# Patient Record
Sex: MALE | Race: WHITE | URBAN - METROPOLITAN AREA
[De-identification: names, ages, dates, MRNs, and addresses within clinical notes are randomized per-mention and may not be internally consistent; named-entity substitution may affect disease eponyms.]

---

## 2024-08-14 ENCOUNTER — HOME HEALTH ADMISSION (OUTPATIENT)
Age: 69
End: 2024-08-14
Payer: MEDICARE

## 2024-08-16 ENCOUNTER — HOME CARE VISIT (OUTPATIENT)
Age: 69
End: 2024-08-16

## 2024-08-16 VITALS
SYSTOLIC BLOOD PRESSURE: 146 MMHG | HEART RATE: 96 BPM | DIASTOLIC BLOOD PRESSURE: 72 MMHG | TEMPERATURE: 97.6 F | RESPIRATION RATE: 20 BRPM | OXYGEN SATURATION: 100 %

## 2024-08-16 PROCEDURE — 0221000100 HH NO PAY CLAIM PROCEDURE

## 2024-08-16 PROCEDURE — G0299 HHS/HOSPICE OF RN EA 15 MIN: HCPCS

## 2024-08-16 ASSESSMENT — ENCOUNTER SYMPTOMS
BOWEL INCONTINENCE: 1
PAIN LOCATION - PAIN QUALITY: SHARP

## 2024-08-16 NOTE — HOME HEALTH
are met and understands all medication purpose/frequencies/side effects. Pt/Caregiver did verbalize understanding of discharge planning.     Next MD appointment TBD (date) with  MD/NP/PA.  Patient/caregiver encouraged/instructed to keep appointment as lack of follow through with physician appointment could result in discontinuation of home care services for non-compliance. Will call pcp today.

## 2024-08-19 ENCOUNTER — HOME CARE VISIT (OUTPATIENT)
Age: 69
End: 2024-08-19

## 2024-08-19 VITALS
OXYGEN SATURATION: 98 % | TEMPERATURE: 97 F | SYSTOLIC BLOOD PRESSURE: 144 MMHG | RESPIRATION RATE: 18 BRPM | DIASTOLIC BLOOD PRESSURE: 70 MMHG | HEART RATE: 87 BPM

## 2024-08-19 PROCEDURE — G0300 HHS/HOSPICE OF LPN EA 15 MIN: HCPCS

## 2024-08-19 ASSESSMENT — ENCOUNTER SYMPTOMS: DIARRHEA: 1

## 2024-08-19 NOTE — HOME HEALTH
Skilled Needs: Education and Wound Care   Caregiver involvement: Pt dependent with care getting in and out of the bed    Medications reviewed and all medications are available in the home this visit.    The following education was provided regarding medications:  HERMAN SIMMONS notified of any discrepancies/look a-like medications/medication interactions: HERMAN  Medications are effecrtive at this time.    Home health supplies by type and quantity ordered/delivered this visit include:  Supplies available   Patient education provided this visit:  Reviewed to reported any redness, swelling, warmth, fever, chills, increased heart/respiratory rate, uncontrolled pain/tenderness, drainage:green/yellow/brown, or odor to MD immediately. Nurse arrived pt in bed no distress noted. Wound Vac in place. Wound Vac removed wound cleaned and vac reapplied as ordered. Nurse assised pt and son with getting pt in the sadie lift into the chair no concerns at this time.  Pt did not check glucose leve due to needing batteries for glucometer. Set schedule for visits with patient for the rest of this week.   Sharps education provided: HERMAN  Patient level of understanding of education provided: Verbalzied all understanding to above education  Skilled Care Performed this visit: Education and Wound Care  Patient response to procedure performed: Minimal pain during dressing change   Agency Progress toward goals: Progressing toward interventions above  Patient's Progress towards personal goals: when patient reaches goals and medication is managed, and disease processes are understood patient agrees and understand that discharge will take place

## 2024-08-20 ENCOUNTER — HOME CARE VISIT (OUTPATIENT)
Age: 69
End: 2024-08-20

## 2024-08-20 VITALS
DIASTOLIC BLOOD PRESSURE: 76 MMHG | HEART RATE: 90 BPM | RESPIRATION RATE: 16 BRPM | TEMPERATURE: 98.2 F | SYSTOLIC BLOOD PRESSURE: 140 MMHG | OXYGEN SATURATION: 98 %

## 2024-08-20 PROCEDURE — G0151 HHCP-SERV OF PT,EA 15 MIN: HCPCS

## 2024-08-20 ASSESSMENT — ENCOUNTER SYMPTOMS: PAIN LOCATION - PAIN QUALITY: ACHE

## 2024-08-21 ENCOUNTER — HOME CARE VISIT (OUTPATIENT)
Age: 69
End: 2024-08-21

## 2024-08-21 VITALS
SYSTOLIC BLOOD PRESSURE: 140 MMHG | HEART RATE: 76 BPM | OXYGEN SATURATION: 97 % | RESPIRATION RATE: 19 BRPM | TEMPERATURE: 97 F | DIASTOLIC BLOOD PRESSURE: 70 MMHG

## 2024-08-21 VITALS
TEMPERATURE: 97.7 F | HEART RATE: 90 BPM | OXYGEN SATURATION: 97 % | SYSTOLIC BLOOD PRESSURE: 130 MMHG | DIASTOLIC BLOOD PRESSURE: 82 MMHG

## 2024-08-21 PROCEDURE — G0300 HHS/HOSPICE OF LPN EA 15 MIN: HCPCS

## 2024-08-21 PROCEDURE — G0157 HHC PT ASSISTANT EA 15: HCPCS

## 2024-08-21 ASSESSMENT — ENCOUNTER SYMPTOMS
PAIN LOCATION - PAIN QUALITY: BURNING
BOWEL INCONTINENCE: 1

## 2024-08-21 NOTE — HOME HEALTH
Skilled Needs: Education and Wound Care   Caregiver involvement: Pt dependent with care with the help of family and with the exception of wound treatment due to location   Medications reviewed and all medications are available in the home this visit.    The following education was provided regarding medications:  HERMAN SIMMONS notified of any discrepancies/look a-like medications/medication interactions: HERMAN  Medications are effecrtive at this time.    Home health supplies by type and quantity ordered/delivered this visit include:  Supplies available   Patient education provided this visit:  Reviewed to reported any redness, swelling, warmth, fever, chills, increased heart/respiratory rate, uncontrolled pain/tenderness, drainage:green/yellow/brown, or odor to MD immediately. Wound Vac dressing changed with no pain voiced. Pt had a BM nurse cleaned and changed pt and helped son reposition pt in the bed. Nurse educated he needs to get strips and batteries for his glucomoeter to keep control of bloodsugars exspecially with being on insulin. Son voiced was going to get them today. No change in medication at this time   Sharps education provided: HERMAN  Patient level of understanding of education provided: Verbalzied all understanding to above education  Skilled Care Performed this visit: Education and Wound Care  Patient response to procedure performed: Minimal pain during dressing change   Agency Progress toward goals: Progressing toward interventions above  Patient's Progress towards personal goals: when patient reaches goals and medication is managed, and disease processes are understood patient agrees and understand that discharge will take place
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
## 2024-08-21 NOTE — HOME HEALTH
SUBJECTIVE: The pt at home, alone, at the time of today's visit. The pt states that he ordered a sliding board and it should be delivered tomorrow.   .  PAIN: see pain assessment  OBJECTIVE: see interventions  .  NEXT MD APPT: MARTINA  .  CAREGIVER ASSISTANCE NEEDED FOR: The pt lives with his wife and son. His son is his primary CG. He requires assistance for ADLs, transportation, shopping, meals, medication management, safety.  .  ASSESSMENT AND PROGRESS TOWARD GOALS: The pt presents with generalized weakness with increased R knee pain, worsening with mvmt. He is D for transfers at this time and requires additonal time and assistance for all mobility. He will benefit from continued HHPT to increase strength and improve the ability to transfer - reducing the overall burden of care and increased risks of rehospitalizations.   PATIENT RESPONSE TO TREATMENT: reported fatigue after today's visit.   PATIENT LEVEL OF UNDERSTANDING OF EDUCATION: The pt able to teach back the HEP.   .  CONTINUED NEED FOR THE FOLLOWING SKILLS: HH PT is medically necessary to address pain, decreased ROM, decreased strength, increased swelling, impaired bed mobility, decreased independence with functional transfers, impaired gait, impaired stair negotiation, and impaired balance in order to improve functional independence, quality of life, return to PLOF, reduce the risk for falls, and reduce pain.  .  PLAN: Plan to address transfers with the sliding board, next visit.   DISCHARGE PLANNING DISCUSSED: Discharge to self and family under MD supervision once all goals have been met or patient has reached maximum potential. Discussed with the pt the POC to continue HHPT with the remaining frequency of 1 more visit this week then 3w3, 1w1. The pt is in agreement to the POC at this time.

## 2024-08-22 ASSESSMENT — ENCOUNTER SYMPTOMS: PAIN LOCATION - PAIN QUALITY: PAIN

## 2024-08-23 ENCOUNTER — HOME CARE VISIT (OUTPATIENT)
Age: 69
End: 2024-08-23
Payer: MEDICARE

## 2024-08-23 ENCOUNTER — HOME CARE VISIT (OUTPATIENT)
Age: 69
End: 2024-08-23

## 2024-08-23 VITALS
TEMPERATURE: 97.8 F | OXYGEN SATURATION: 99 % | HEART RATE: 91 BPM | SYSTOLIC BLOOD PRESSURE: 104 MMHG | DIASTOLIC BLOOD PRESSURE: 60 MMHG

## 2024-08-23 VITALS
HEART RATE: 91 BPM | TEMPERATURE: 97.8 F | DIASTOLIC BLOOD PRESSURE: 60 MMHG | SYSTOLIC BLOOD PRESSURE: 104 MMHG | OXYGEN SATURATION: 99 % | RESPIRATION RATE: 18 BRPM

## 2024-08-23 PROCEDURE — G0157 HHC PT ASSISTANT EA 15: HCPCS

## 2024-08-23 PROCEDURE — G0300 HHS/HOSPICE OF LPN EA 15 MIN: HCPCS

## 2024-08-23 PROCEDURE — G0152 HHCP-SERV OF OT,EA 15 MIN: HCPCS

## 2024-08-23 ASSESSMENT — ENCOUNTER SYMPTOMS: PAIN LOCATION - PAIN QUALITY: ARTHRITIC

## 2024-08-23 NOTE — HOME HEALTH
Caregiver involvement: Raymond (son) lives with son and assisting with all ADL    Medications reviewed and all medications are available in the home this visit.    The following education was provided regarding medications, medication interactions, and look alike medications (specify): Continue as directed by MD.    Medications  are effective at this time.      Patient education provided this visit: see ADL note    Sharps education provided:  Clinician instructed patient/CG on proper disposal of sharps: Containers should be made of hard plastic, be puncture-resistant and leakproof, such as a laundry detergent or bleach bottle.  When the container is ¾ full, it should be sealed with tape and labeled DO NOT RECYCLE prior to discarding in the regular trash.    Pt does not check BG daily    Patient level of understanding of education provided: see ADL note    Skilled Care Performed this visit: Completed OT evaluation and assessment for safety with ADL and mobility.    Modified Barthel ADL Index  Bowels              (x ) 0 = Incontinent or needs enemas              ( ) 1 = Occasional Accident              ( ) 2 = Continent  Bladder               (x ) 0 = Incontinent              ( ) 1 = Occasional accident (1x/wk)              ( ) 2 = Continent  Grooming               (x ) 0 = Needs help with personal care              ( ) 1 = Independent (including face, hair, teeth, shaving)  Toilet Use               (x ) 0 = Dependent              ( ) 1 = Needs some help              ( ) 2 = Independent  Feeding              ( ) 0 = Unable              ( ) 1 = Needs help, e.g. cutting              ( x) 2 = Independent  Transfers   (bed to chair and back)              ( ) 0 = Unable, no sitting balance              (x ) 1 = Major help (1 or 2 people), can sit              ( ) 2 = Minor help (verbal or physical)              ( ) 3 = Independent  Mobility              (x ) 0 = Immobile              ( ) 1 = Wheelchair independent (including

## 2024-08-25 VITALS
OXYGEN SATURATION: 98 % | RESPIRATION RATE: 16 BRPM | SYSTOLIC BLOOD PRESSURE: 132 MMHG | TEMPERATURE: 97 F | DIASTOLIC BLOOD PRESSURE: 78 MMHG | HEART RATE: 76 BPM

## 2024-08-26 ENCOUNTER — HOME CARE VISIT (OUTPATIENT)
Age: 69
End: 2024-08-26
Payer: MEDICARE

## 2024-08-26 VITALS
SYSTOLIC BLOOD PRESSURE: 132 MMHG | TEMPERATURE: 97.7 F | HEART RATE: 89 BPM | RESPIRATION RATE: 18 BRPM | OXYGEN SATURATION: 98 % | DIASTOLIC BLOOD PRESSURE: 74 MMHG

## 2024-08-26 VITALS
HEART RATE: 98 BPM | SYSTOLIC BLOOD PRESSURE: 138 MMHG | OXYGEN SATURATION: 98 % | TEMPERATURE: 98 F | DIASTOLIC BLOOD PRESSURE: 76 MMHG

## 2024-08-26 VITALS
RESPIRATION RATE: 17 BRPM | TEMPERATURE: 98.3 F | OXYGEN SATURATION: 97 % | DIASTOLIC BLOOD PRESSURE: 82 MMHG | HEART RATE: 88 BPM | SYSTOLIC BLOOD PRESSURE: 120 MMHG

## 2024-08-26 VITALS
HEART RATE: 89 BPM | DIASTOLIC BLOOD PRESSURE: 74 MMHG | OXYGEN SATURATION: 98 % | TEMPERATURE: 97.7 F | SYSTOLIC BLOOD PRESSURE: 132 MMHG | RESPIRATION RATE: 18 BRPM

## 2024-08-26 PROCEDURE — G0153 HHCP-SVS OF S/L PATH,EA 15MN: HCPCS

## 2024-08-26 PROCEDURE — G0300 HHS/HOSPICE OF LPN EA 15 MIN: HCPCS

## 2024-08-26 PROCEDURE — G0158 HHC OT ASSISTANT EA 15: HCPCS

## 2024-08-26 PROCEDURE — G0157 HHC PT ASSISTANT EA 15: HCPCS

## 2024-08-26 ASSESSMENT — ENCOUNTER SYMPTOMS
PAIN LOCATION - PAIN QUALITY: SHARP, STABBING
PAIN LOCATION - PAIN QUALITY: ACHING
PAIN LOCATION - PAIN QUALITY: TENDER

## 2024-08-26 NOTE — HOME HEALTH
SUBJECTIVE: The pt states that he spent 6-7 hours out of the bed yesterday.   .  PAIN: see pain assessment    OBJECTIVE: see interventions  .  NEXT MD APPT: Wednesday 8/28/24 - [facility] f/u appt, 9/6/24 with PCP  .  CAREGIVER ASSISTANCE NEEDED FOR: The pt lives with his wife and son. His son is his primary CG. He requires assistance for ADLs, transportation, shopping, meals, medication management, safety.  .  ASSESSMENT AND PROGRESS TOWARD GOALS/PATIENT RESPONSE TO TREATMENT: The pt continues to be limited with activity due to R knee pain. He is improving as noted with the ability to perform sliding board transfers improving from sadie lift transfers, but he does continue to require mod/max(A) for the transfers and for board placement. He will continue to benefit from HHPT to progress the safe ability to perform functional mobility, reducing the risks of falls/hospitalizations and the overall burden of care, progressing towards PLF.     PATIENT LEVEL OF UNDERSTANDING OF EDUCATION: The pt able to teach back the need for increased frequency of positional changes for reducing continued skin breakdown.   .  CONTINUED NEED FOR THE FOLLOWING SKILLS: HH PT is medically necessary to address pain, decreased ROM, decreased strength, increased swelling, impaired bed mobility, decreased independence with functional transfers, impaired gait, and impaired balance in order to improve functional independence, quality of life, return to PLOF, reduce the risk for falls, and reduce pain.  .  PLAN:  Plan to continue to progress the strengthening program and work on improving the safe (I) ability to perform transfers.   .  DISCHARGE PLANNING DISCUSSED: Discharge to self and family under MD supervision once all goals have been met or patient has reached maximum potential. Discussed with the pt the POC to continue HHPT with the remaining frequency of 2 more visits this week then 3w2, 1w1. The pt is in agreement to the POC at this time.

## 2024-08-26 NOTE — CASE COMMUNICATION
Patient seen for speech therapy evaluation today. Educated patient on SLP scope of practice, patient denied any speech, cognition, or swallowing concerns. Patient completed SLUMS Assessment with score of 29/30, which suggests cognition is WFL. Patient was able to recall and provide recent medical history with no difficulty. No further speech therapy services warranted at this time.

## 2024-08-26 NOTE — HOME HEALTH
Skilled Needs: Education and Wound Care   Caregiver involvement: Pt independent with care with the exception of wound treatment due to location   Medications reviewed and all medications are available in the home this visit.    The following education was provided regarding medications:  HERMAN SIMMONS notified of any discrepancies/look a-like medications/medication interactions: HERMAN  Medications are effecrtive at this time.    Home health supplies by type and quantity ordered/delivered this visit include:  Supplies available   Patient education provided this visit:  Reviewed to reported any redness, swelling, warmth, fever, chills, increased heart/respiratory rate, uncontrolled pain/tenderness, drainage:green/yellow/brown, or odor to MD immediately. Wound vac dressing changed with no pain verbalzied this visit Pt is due to see his PCP on the 8th of Septmeber, Pt is due to see Surgeon on wednesday and will not need a nursing visit until friday. Jack advised pt will be out of town and will be back to do wound the following week but someone will be filling in fo rme while I am gone,   Fiona education provided: HERMAN  Patient level of understanding of education provided: Verbalzied all understanding to above education  Skilled Care Performed this visit: Education and Wound Care  Patient response to procedure performed: Minimal pain during dressing change   Agency Progress toward goals: Progressing toward interventions above  Patient's Progress towards personal goals: when patient reaches goals and medication is managed, and disease processes are understood patient agrees and understand that discharge will take place

## 2024-08-26 NOTE — CASE COMMUNICATION
Occupational Therapy Evaluation    1w1, 2w4    Mr. Obregon is bed bound and unable to ambulate.  Family assists him with transfers using a sadie lift from the hospital bed to the w/c.  Mr. Obregon is able to perform supine <> sit with min A to manage LEs.  He is CGA to roll to the R side with bed rail but max/dependent to roll to the L.  Mr. Obregon is dependent on family for all self care to include grooming, sponge bathing, dressing and william leting.  Today, pt was provided a wash cloth and he was able to participate with washing face and upper body.  He was able to apply deodorant.  Instruction provided to family to encourage pt participation with self care when able to improve pt participation with daily routines.  Currently, pt is using a brief for bowel and bladder management.  He reports that he is able to control bowel and bladder but uses brief for ease.  He has a sta ndard 3:1.  Recommended use of a drop arm commode for ease with transfers using a SB.  Pt performed SB transfer training with PT today.  Son agreeable to assist pt with purchase of a drop arm commode.  Mr. Obregon is agreeable for ongoing OT to maximize his safety and participation with daily routines.

## 2024-08-26 NOTE — HOME HEALTH
SUBJECTIVE: Pt reclined in bed upon arrival, reported he was feeling fair and was having the same pain in his R leg and L shoulder that he has been having.  CAREGIVER ASSISTANCE NEEDED FOR: Pts son present during visit for education and assistance  MEDICATIONS REVIEWED AND UPDATED: no medication changes reported  .  OBJECTIVE: see interventions  PATIENT RESPONSE TO TREATMENT: Pt demonstrated positive response to treatment with good participation and training.  .  PATIENT/CAREGIVER EDUCATION PROVIDED THIS VISIT:  Therapist educated pt and son on bed level exercises, bed mobility techniques and use of bed ladder to help with UE and core strengthening.  PATIENT LEVEL OF UNDERSTANDING OF EDUCATION PROVIDED: Pt and CG demonstrated good teachback with exercises and bed mobility, verbalized they will order bed ladder for working with.  ASSESSMENT AND PROGRESS TOWARD GOALS: Pt demonstrated good progress with education to achieve goals set forth.  PLAN: next visit will be for bed mobility and ADL training.   DISCHARGE PLANNING DISCUSSED: Discharge to self and family under MD supervision once all goals have been met or patient has reached maximum potential. Frequency remaininw1, 2w3 remaining.
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
## 2024-08-27 ENCOUNTER — HOME CARE VISIT (OUTPATIENT)
Age: 69
End: 2024-08-27
Payer: MEDICARE

## 2024-08-27 VITALS
HEART RATE: 99 BPM | OXYGEN SATURATION: 98 % | SYSTOLIC BLOOD PRESSURE: 124 MMHG | DIASTOLIC BLOOD PRESSURE: 80 MMHG | TEMPERATURE: 98 F

## 2024-08-27 PROCEDURE — G0158 HHC OT ASSISTANT EA 15: HCPCS

## 2024-08-27 PROCEDURE — G0157 HHC PT ASSISTANT EA 15: HCPCS

## 2024-08-27 ASSESSMENT — ENCOUNTER SYMPTOMS: PAIN LOCATION - PAIN QUALITY: STABBING PAIN

## 2024-08-27 NOTE — HOME HEALTH
SUBJECTIVE: The pt states that his next goal is to get on the BSC to have a bowel mvmt. The pt wearing a knee brace on this date that he feels is giving him some relief in the pain.   .  PAIN: see pain assessment  OBJECTIVE: see interventions  .  NEXT MD APPT: Wednesday 8/28/24 - [facility] f/u appt, 9/6/24 with PCP  .  CAREGIVER ASSISTANCE NEEDED FOR: The pt lives with his wife and son. His son is his primary CG. He requires assistance for ADLs, transportation, shopping, meals, medication management, safety.  .  ASSESSMENT AND PROGRESS TOWARD GOALS/PATIENT RESPONSE TO TREATMENT: The pt demonstrated a positive result in HHPT on this date as noted with the ability to perform bed<>BSC transfers utilizing stand/pivot with therapist, max(A). He also demonstrated improved sit to stand from the bed to the rollator walker with mod(ANGEL) with the ability to stand x 10 secs. Right knee pain continues to limit his overall mobility. He will benefit from continued HHPT to progress the ability to perform functional mobility, decreasing his fall risks and the overall burden of care.   .  PATIENT LEVEL OF UNDERSTANDING OF EDUCATION: The pt able to teach back the need for increased frequency of positional changes for reducing continued skin breakdown. .    CONTINUED NEED FOR THE FOLLOWING SKILLS: HH PT is medically necessary to address pain, decreased ROM, decreased strength, increased swelling, impaired bed mobility, decreased independence with functional transfers, impaired gait, and impaired balance in order to improve functional independence, quality of life, return to PLOF, reduce the risk for falls, and reduce pain.  .  PLAN: Plan to progress the HEP and readdress transfers, next visit.   .  DISCHARGE PLANNING DISCUSSED: Discharge to self and family under MD supervision once all goals have been met or patient has reached maximum potential. Discussed with the pt the POC to continue HHPT with the remaining frequency of 1 more visit this

## 2024-08-28 ENCOUNTER — HOME CARE VISIT (OUTPATIENT)
Age: 69
End: 2024-08-28
Payer: MEDICARE

## 2024-08-28 VITALS
OXYGEN SATURATION: 100 % | HEART RATE: 82 BPM | TEMPERATURE: 97.7 F | SYSTOLIC BLOOD PRESSURE: 118 MMHG | RESPIRATION RATE: 18 BRPM | DIASTOLIC BLOOD PRESSURE: 84 MMHG

## 2024-08-28 PROCEDURE — G0155 HHCP-SVS OF CSW,EA 15 MIN: HCPCS

## 2024-08-28 ASSESSMENT — ENCOUNTER SYMPTOMS: PAIN LOCATION - PAIN QUALITY: ACHING, SORE

## 2024-08-28 NOTE — HOME HEALTH
SUBJECTIVE: Pt sitting EOB upon arrival, reported he had gotten there on his own with sons S, pt agreeable to work on bed mobility, ADLs and transfers.  CAREGIVER ASSISTANCE NEEDED FOR: pts son present during visit for education  MEDICATIONS REVIEWED AND UPDATED: no medication changes reported  .  OBJECTIVE: see interventions  PATIENT RESPONSE TO TREATMENT: Pt demonstrated positive response to treatment with min increased pain and good participation.  .  PATIENT/CAREGIVER EDUCATION PROVIDED THIS VISIT:  Therapist educated pt on bed mobility, SB transfers and ADL technique for dressing.  PATIENT LEVEL OF UNDERSTANDING OF EDUCATION PROVIDED: Pt demonstrated good teachback and verbalized understanding for daily carry over.  ASSESSMENT AND PROGRESS TOWARD GOALS: Pt demonstrated good progress with increased I to maneuver in bed, perform dressing and SB transfers with decreased assistance.  PLAN: next visit will be for continued transfer and ADL training.   DISCHARGE PLANNING DISCUSSED: Discharge to self and family under MD supervision once all goals have been met or patient has reached maximum potential. Frequency remaininw3 remaining.

## 2024-08-29 ENCOUNTER — HOME CARE VISIT (OUTPATIENT)
Age: 69
End: 2024-08-29
Payer: MEDICARE

## 2024-08-29 VITALS
OXYGEN SATURATION: 100 % | HEART RATE: 99 BPM | TEMPERATURE: 97.9 F | SYSTOLIC BLOOD PRESSURE: 118 MMHG | DIASTOLIC BLOOD PRESSURE: 68 MMHG

## 2024-08-29 VITALS
HEART RATE: 78 BPM | OXYGEN SATURATION: 97 % | RESPIRATION RATE: 18 BRPM | DIASTOLIC BLOOD PRESSURE: 68 MMHG | TEMPERATURE: 97 F | SYSTOLIC BLOOD PRESSURE: 116 MMHG

## 2024-08-29 PROCEDURE — G0300 HHS/HOSPICE OF LPN EA 15 MIN: HCPCS

## 2024-08-29 PROCEDURE — G0157 HHC PT ASSISTANT EA 15: HCPCS

## 2024-08-29 NOTE — HOME HEALTH
SUBJECTIVE: The pt seated in the WC upon today's arrival - new WC arrived today with the appropriate fit. The pt wearing a knee brace with a hole at the patella with noted edema, the brace rolled down due to apparent indentations from the increased edema. PTA educated the pt on need for resting time from the brace, elevation and ice for edema control.  .  PAIN: see pain assessment    OBJECTIVE: see interventions  .  NEXT MD APPT: Wednesday 8/28/24 - [facility] f/u appt, 9/6/24 with PCP  .  CAREGIVER ASSISTANCE NEEDED FOR: The pt lives with his wife and son. His son is his primary CG. He requires assistance for ADLs, transportation, shopping, meals, medication management, safety.  .  ASSESSMENT AND PROGRESS TOWARD GOALS/PATIENT RESPONSE TO TREATMENT: The pts progress is limited by R knee pain at this time. The pt unable to stand safely without R knee pain; therefore, making gait not safe/tolerable at this time. The pt unable to participate in ther exs on this date due to increased R knee pain with mvmt. Due to R knee pain he is at risks for falls and unable to progress towards ambulation and stand/pivot transfers with the walker and unable to tolerate R knee ther exs on this date. He will benefit from a medical appt to address R knee pain in which he was instructed to do at the time of today's appt.  .  PATIENT LEVEL OF UNDERSTANDING OF EDUCATION: The pt able to teach back the need for increased frequency of positional changes for reducing continued skin breakdown. .    CONTINUED NEED FOR THE FOLLOWING SKILLS: HH PT is medically necessary to address pain, decreased ROM, decreased strength, increased swelling, impaired bed mobility, decreased independence with functional transfers, impaired gait, and impaired balance in order to improve functional independence, quality of life, return to PLOF, reduce the risk for falls, and reduce pain.  .  PLAN: stretching and supine ther ex progression next visit, will readdress transfers

## 2024-08-29 NOTE — HOME HEALTH
Skilled Needs: Education and Wound Care   Caregiver involvement: Pt independent with care with the exception of wound treatment due to location   Medications reviewed and all medications are available in the home this visit.    The following education was provided regarding medications:  HERMAN SIMMONS notified of any discrepancies/look a-like medications/medication interactions: HERMAN  Medications are effecrtive at this time.    Home health supplies by type and quantity ordered/delivered this visit include:  Supplies available   Patient education provided this visit:  Reviewed to reported any redness, swelling, warmth, fever, chills, increased heart/respiratory rate, uncontrolled pain/tenderness, drainage:green/yellow/brown, or odor to MD immediately. Wound care completed vac removed and dressing changed with minimal pain vrbalzied at time of dressing change. Pts drop down commode arrived during nursing visit. Pt looking forward to getting stronger and being able to wak again. No other changes since last visit.  Pt is not checking his glucose level as he is suppose to nurse educated ths importance of keeping rack of glucose to help with wound healing Pt verbalzied he understood.   Sharps education provided: HERMAN  Patient level of understanding of education provided: Verbalzied all understanding to above education  Skilled Care Performed this visit: Education and Wound Care  Patient response to procedure performed: Minimal pain during dressing change   Agency Progress toward goals: Progressing toward interventions above  Patient's Progress towards personal goals: when patient reaches goals and medication is managed, and disease processes are understood patient agrees and understand that discharge will take place

## 2024-08-30 ENCOUNTER — HOME CARE VISIT (OUTPATIENT)
Age: 69
End: 2024-08-30
Payer: MEDICARE

## 2024-09-02 ENCOUNTER — HOME CARE VISIT (OUTPATIENT)
Age: 69
End: 2024-09-02
Payer: MEDICARE

## 2024-09-03 ENCOUNTER — HOME CARE VISIT (OUTPATIENT)
Age: 69
End: 2024-09-03
Payer: MEDICARE

## 2024-09-03 VITALS
OXYGEN SATURATION: 100 % | RESPIRATION RATE: 16 BRPM | HEART RATE: 98 BPM | SYSTOLIC BLOOD PRESSURE: 148 MMHG | DIASTOLIC BLOOD PRESSURE: 82 MMHG | TEMPERATURE: 97.7 F

## 2024-09-03 VITALS
TEMPERATURE: 98.5 F | DIASTOLIC BLOOD PRESSURE: 72 MMHG | HEART RATE: 91 BPM | OXYGEN SATURATION: 97 % | SYSTOLIC BLOOD PRESSURE: 136 MMHG

## 2024-09-03 ASSESSMENT — ENCOUNTER SYMPTOMS
PAIN LOCATION - PAIN QUALITY: SORE, ACHE, TENDER
PAIN LOCATION - PAIN QUALITY: SHARP
BOWEL INCONTINENCE: 1

## 2024-09-03 NOTE — HOME HEALTH
Skilled reason for visit: wound care  called [facility] surgical group as left hip wound: foul odor, large purulent drainage, erythema around the wound and warmth noted. periarea irritation (open areas wound the distal part of wound) reported smell started a couple of days ago. reported having chills the other night. per Kevin Ortiz requested wound vac be left off and silver alginate Ag packing/DSD ok. will prescribed antibiotics and scheduled appt for tomorrow for wound eval.  verbalized understanding and repeat back    Caregiver involvement: son and wife assist with ADLS/meal prep/med management and medical transport to appts.    Medications reviewed and all medications are available in the home this visit.    The following education was provided regarding medications:  reviewed all medication bottles in home for frequency, side effects and precautions. verbalized understanding and repeat back        Medications are effective at this time.      Home health supplies by type and quantity ordered/delivered this visit include: wound care supplies in the home  Patient education provided this visit: see intervention tab.          Patient level of understanding of education provided: see intervention tab.      Patient response to procedure performed:  tolerated wound care with complaints of burning but resolved after treatment    Agency Progress toward goals: see intervention tab for progressing toward goals      Patient's Progress towards personal goals: see intervention tab for progressing toward goals        Home exercise program: cont to take all medications/diet as prescribed, follow pressure ulcer precautions, follow all fall precautions and use any assistive devices recommended by therapy or medical providers, reported any abnormal s/sx of infection to MD or ER immediately. disease process teaching packets/ home care booklet for reference. call home care for any concerns/questions. keep all medical

## 2024-09-03 NOTE — HOME HEALTH
declined to return to the bed on this date.   .    DISCHARGE PLANNING DISCUSSED: Discharge to self and family under MD supervision once all goals have been met or patient has reached maximum potential. Discussed with the pt the POC to continue HHPT with the remaining frequency of 2 more visits this week then 3w1, 1w1. The pt is in agreement to the POC at this time.

## 2024-09-04 ENCOUNTER — HOME CARE VISIT (OUTPATIENT)
Age: 69
End: 2024-09-04
Payer: MEDICARE

## 2024-09-04 VITALS
DIASTOLIC BLOOD PRESSURE: 72 MMHG | SYSTOLIC BLOOD PRESSURE: 120 MMHG | OXYGEN SATURATION: 94 % | TEMPERATURE: 98.1 F | HEART RATE: 92 BPM

## 2024-09-04 VITALS
TEMPERATURE: 98.5 F | DIASTOLIC BLOOD PRESSURE: 72 MMHG | RESPIRATION RATE: 18 BRPM | HEART RATE: 91 BPM | SYSTOLIC BLOOD PRESSURE: 136 MMHG | OXYGEN SATURATION: 97 %

## 2024-09-04 VITALS
DIASTOLIC BLOOD PRESSURE: 80 MMHG | HEART RATE: 87 BPM | RESPIRATION RATE: 18 BRPM | SYSTOLIC BLOOD PRESSURE: 146 MMHG | OXYGEN SATURATION: 98 % | TEMPERATURE: 97 F

## 2024-09-04 ASSESSMENT — ENCOUNTER SYMPTOMS
PAIN LOCATION - PAIN QUALITY: ACHE
PAIN LOCATION - PAIN QUALITY: SORE, TENDER

## 2024-09-04 NOTE — HOME HEALTH
Caregiver involvement: pt's son is present and assisting pt with all ADL and mobility out of bed    Medications reviewed and all medications are available in the home this visit.    The following education was provided regarding medications, medication interactions, and look alike medications (specify): Continue as directed by MD.    Medications  are effective at this time.      Patient education provided this visit: see interventions    Sharps education provided:  na    Patient level of understanding of education provided: see interventions    Skilled Care Performed this visit: ADL and bed mobility training    Patient response to procedure performed:  Pt had a postive response to therapy.  Pt reports reduce pain with activity and vitals were within therapy parameters    Patient's Progress towards personal goals: pt has good rehab potential and is progressing with mobility and ADL participation at bed level.  Goal is to advance LB ADL with DME training next visit.    Home exercise program: Pt to perform AAROM exercises while seated with B hands clasped/support to the L UE to perform shoulder flexion/extension, ab/adduction, elbow flexion/extension, supination/pronation, wrist flexion/extension and gross grasp and release.    Continued need for the following skills: OT, PT and SN    Discharge Plans:  1w1, 2w2 with plans to d/c to self once maximal potential has been achieved with self care and functinal mobility in the home.

## 2024-09-05 ENCOUNTER — HOME CARE VISIT (OUTPATIENT)
Age: 69
End: 2024-09-05
Payer: MEDICARE

## 2024-09-05 PROCEDURE — G0152 HHCP-SERV OF OT,EA 15 MIN: HCPCS

## 2024-09-06 VITALS
DIASTOLIC BLOOD PRESSURE: 86 MMHG | OXYGEN SATURATION: 98 % | SYSTOLIC BLOOD PRESSURE: 138 MMHG | HEART RATE: 75 BPM | TEMPERATURE: 98.2 F | RESPIRATION RATE: 18 BRPM

## 2024-09-07 ENCOUNTER — HOME CARE VISIT (OUTPATIENT)
Age: 69
End: 2024-09-07
Payer: MEDICARE

## 2024-09-09 ENCOUNTER — HOME CARE VISIT (OUTPATIENT)
Age: 69
End: 2024-09-09
Payer: MEDICARE

## 2024-09-09 VITALS
DIASTOLIC BLOOD PRESSURE: 76 MMHG | HEART RATE: 99 BPM | RESPIRATION RATE: 18 BRPM | SYSTOLIC BLOOD PRESSURE: 132 MMHG | OXYGEN SATURATION: 98 % | TEMPERATURE: 97.4 F

## 2024-09-09 VITALS
OXYGEN SATURATION: 98 % | SYSTOLIC BLOOD PRESSURE: 132 MMHG | DIASTOLIC BLOOD PRESSURE: 76 MMHG | HEART RATE: 99 BPM | TEMPERATURE: 97.4 F

## 2024-09-09 PROCEDURE — G0300 HHS/HOSPICE OF LPN EA 15 MIN: HCPCS

## 2024-09-09 PROCEDURE — G0157 HHC PT ASSISTANT EA 15: HCPCS

## 2024-09-09 ASSESSMENT — ENCOUNTER SYMPTOMS: PAIN LOCATION - PAIN QUALITY: SORE, SHARP

## 2024-09-10 ENCOUNTER — HOME CARE VISIT (OUTPATIENT)
Age: 69
End: 2024-09-10
Payer: MEDICARE

## 2024-09-10 VITALS
HEART RATE: 77 BPM | DIASTOLIC BLOOD PRESSURE: 70 MMHG | SYSTOLIC BLOOD PRESSURE: 108 MMHG | TEMPERATURE: 97.7 F | OXYGEN SATURATION: 100 %

## 2024-09-10 PROCEDURE — G0157 HHC PT ASSISTANT EA 15: HCPCS

## 2024-09-10 ASSESSMENT — ENCOUNTER SYMPTOMS: PAIN LOCATION - PAIN QUALITY: SORE

## 2024-09-11 ENCOUNTER — HOME CARE VISIT (OUTPATIENT)
Age: 69
End: 2024-09-11
Payer: MEDICARE

## 2024-09-11 VITALS
SYSTOLIC BLOOD PRESSURE: 124 MMHG | TEMPERATURE: 97.8 F | RESPIRATION RATE: 18 BRPM | HEART RATE: 90 BPM | DIASTOLIC BLOOD PRESSURE: 70 MMHG | OXYGEN SATURATION: 100 %

## 2024-09-11 PROCEDURE — G0157 HHC PT ASSISTANT EA 15: HCPCS

## 2024-09-11 PROCEDURE — G0152 HHCP-SERV OF OT,EA 15 MIN: HCPCS

## 2024-09-11 ASSESSMENT — ENCOUNTER SYMPTOMS: PAIN LOCATION - PAIN QUALITY: ACHE

## 2024-09-12 ENCOUNTER — HOME CARE VISIT (OUTPATIENT)
Age: 69
End: 2024-09-12
Payer: MEDICARE

## 2024-09-12 VITALS
DIASTOLIC BLOOD PRESSURE: 80 MMHG | HEART RATE: 92 BPM | TEMPERATURE: 97 F | OXYGEN SATURATION: 97 % | SYSTOLIC BLOOD PRESSURE: 124 MMHG

## 2024-09-12 VITALS
RESPIRATION RATE: 18 BRPM | OXYGEN SATURATION: 99 % | DIASTOLIC BLOOD PRESSURE: 84 MMHG | TEMPERATURE: 98.2 F | HEART RATE: 99 BPM | SYSTOLIC BLOOD PRESSURE: 122 MMHG

## 2024-09-12 PROCEDURE — G0152 HHCP-SERV OF OT,EA 15 MIN: HCPCS

## 2024-09-12 ASSESSMENT — ENCOUNTER SYMPTOMS: PAIN LOCATION - PAIN QUALITY: ACHE

## 2024-09-13 ENCOUNTER — HOME CARE VISIT (OUTPATIENT)
Age: 69
End: 2024-09-13
Payer: MEDICARE

## 2024-09-13 PROCEDURE — G0300 HHS/HOSPICE OF LPN EA 15 MIN: HCPCS

## 2024-09-16 ENCOUNTER — HOME CARE VISIT (OUTPATIENT)
Age: 69
End: 2024-09-16
Payer: MEDICARE

## 2024-09-16 VITALS
HEART RATE: 68 BPM | OXYGEN SATURATION: 98 % | SYSTOLIC BLOOD PRESSURE: 128 MMHG | RESPIRATION RATE: 16 BRPM | DIASTOLIC BLOOD PRESSURE: 74 MMHG | TEMPERATURE: 98.4 F

## 2024-09-16 PROCEDURE — G0299 HHS/HOSPICE OF RN EA 15 MIN: HCPCS

## 2024-09-17 ENCOUNTER — HOME CARE VISIT (OUTPATIENT)
Age: 69
End: 2024-09-17
Payer: MEDICARE

## 2024-09-17 VITALS
DIASTOLIC BLOOD PRESSURE: 78 MMHG | HEART RATE: 99 BPM | SYSTOLIC BLOOD PRESSURE: 122 MMHG | OXYGEN SATURATION: 100 % | TEMPERATURE: 97.9 F | RESPIRATION RATE: 18 BRPM

## 2024-09-17 VITALS
DIASTOLIC BLOOD PRESSURE: 60 MMHG | HEART RATE: 111 BPM | TEMPERATURE: 98.3 F | OXYGEN SATURATION: 98 % | SYSTOLIC BLOOD PRESSURE: 108 MMHG

## 2024-09-17 PROCEDURE — G0151 HHCP-SERV OF PT,EA 15 MIN: HCPCS

## 2024-09-17 PROCEDURE — G0152 HHCP-SERV OF OT,EA 15 MIN: HCPCS

## 2024-09-17 ASSESSMENT — ENCOUNTER SYMPTOMS
PAIN LOCATION - PAIN QUALITY: ACHE
PAIN LOCATION - PAIN QUALITY: ACHE

## 2024-09-18 ENCOUNTER — HOME CARE VISIT (OUTPATIENT)
Age: 69
End: 2024-09-18
Payer: MEDICARE

## 2024-09-19 ENCOUNTER — HOME CARE VISIT (OUTPATIENT)
Age: 69
End: 2024-09-19
Payer: MEDICARE

## 2024-09-20 ENCOUNTER — HOME CARE VISIT (OUTPATIENT)
Age: 69
End: 2024-09-20
Payer: MEDICARE

## 2024-09-20 VITALS
SYSTOLIC BLOOD PRESSURE: 120 MMHG | OXYGEN SATURATION: 100 % | DIASTOLIC BLOOD PRESSURE: 70 MMHG | TEMPERATURE: 97.3 F | HEART RATE: 94 BPM

## 2024-09-20 PROCEDURE — G0157 HHC PT ASSISTANT EA 15: HCPCS

## 2024-09-20 ASSESSMENT — ENCOUNTER SYMPTOMS: PAIN LOCATION - PAIN QUALITY: SORE

## 2024-09-23 ENCOUNTER — HOME CARE VISIT (OUTPATIENT)
Age: 69
End: 2024-09-23
Payer: MEDICARE

## 2024-09-23 VITALS
DIASTOLIC BLOOD PRESSURE: 70 MMHG | RESPIRATION RATE: 16 BRPM | HEART RATE: 87 BPM | OXYGEN SATURATION: 96 % | TEMPERATURE: 97 F | SYSTOLIC BLOOD PRESSURE: 130 MMHG

## 2024-09-23 PROCEDURE — G0300 HHS/HOSPICE OF LPN EA 15 MIN: HCPCS

## 2024-09-23 ASSESSMENT — ENCOUNTER SYMPTOMS: BOWEL INCONTINENCE: 1

## 2024-09-25 ENCOUNTER — HOME CARE VISIT (OUTPATIENT)
Age: 69
End: 2024-09-25
Payer: MEDICARE

## 2024-09-25 PROCEDURE — G0157 HHC PT ASSISTANT EA 15: HCPCS

## 2024-09-26 ENCOUNTER — HOME CARE VISIT (OUTPATIENT)
Age: 69
End: 2024-09-26
Payer: MEDICARE

## 2024-09-26 VITALS
TEMPERATURE: 97.7 F | DIASTOLIC BLOOD PRESSURE: 84 MMHG | SYSTOLIC BLOOD PRESSURE: 122 MMHG | OXYGEN SATURATION: 97 % | HEART RATE: 94 BPM

## 2024-09-26 PROCEDURE — G0300 HHS/HOSPICE OF LPN EA 15 MIN: HCPCS

## 2024-09-26 ASSESSMENT — ENCOUNTER SYMPTOMS: PAIN LOCATION - PAIN QUALITY: SORE

## 2024-09-27 ENCOUNTER — HOME CARE VISIT (OUTPATIENT)
Age: 69
End: 2024-09-27
Payer: MEDICARE

## 2024-09-27 VITALS
OXYGEN SATURATION: 97 % | SYSTOLIC BLOOD PRESSURE: 118 MMHG | HEART RATE: 99 BPM | TEMPERATURE: 97.9 F | DIASTOLIC BLOOD PRESSURE: 72 MMHG

## 2024-09-27 PROCEDURE — G0157 HHC PT ASSISTANT EA 15: HCPCS

## 2024-09-27 ASSESSMENT — ENCOUNTER SYMPTOMS: PAIN LOCATION - PAIN QUALITY: ACHE, STABBING

## 2024-09-29 ENCOUNTER — HOME CARE VISIT (OUTPATIENT)
Age: 69
End: 2024-09-29
Payer: MEDICARE

## 2024-10-01 ENCOUNTER — HOME CARE VISIT (OUTPATIENT)
Age: 69
End: 2024-10-01
Payer: MEDICARE

## 2024-10-01 PROCEDURE — G0157 HHC PT ASSISTANT EA 15: HCPCS

## 2024-10-01 PROCEDURE — G0300 HHS/HOSPICE OF LPN EA 15 MIN: HCPCS

## 2024-10-02 VITALS
SYSTOLIC BLOOD PRESSURE: 112 MMHG | DIASTOLIC BLOOD PRESSURE: 70 MMHG | TEMPERATURE: 97.4 F | HEART RATE: 74 BPM | OXYGEN SATURATION: 100 %

## 2024-10-02 ASSESSMENT — ENCOUNTER SYMPTOMS: PAIN LOCATION - PAIN QUALITY: SORE, ACHE

## 2024-10-02 NOTE — HOME HEALTH
SUBJECTIVE: The pt seated in his WC upon today's arrival. He and his son state that they have not been using the BSC regularly, despite multiple discussions on placing the BSC at the end of the bed to improve the pts (I) and toileting program.     PAIN: see pain assessment  .  OBJECTIVE: see interventions  .  NEXT MD APPT: [facility] surgical specialtists.  .  CAREGIVER ASSISTANCE NEEDED FOR: The pt lives with his wife and son. His son is his primary CG. He requires assistance for ADLs, transportation, shopping, meals, medication management, safety.  .  ASSESSMENT AND PROGRESS TOWARD GOALS/PATIENT RESPONSE TO TREATMENT: The pt demonstrated a positive result in HHPT on this date with the improved ability to perform toilet transfers. He continues to require SBA with new set up of BSC over the toilet vs min(A) with bars on toilet. He and his son have been educated on the need to begin a regular toileting program to progress towards PLF. The pt is progressing towards goals for HHPT and will benefit from the transition to outpatient PT for progressive rehabilitation.   .  PATIENT LEVEL OF UNDERSTANDING OF EDUCATION: Good, the patient and his son verbalize the understanding of the need to begin a regular toileting program.   .  PLAN: Plan to review safety and the HEP next visit in anticipation of HHPT DC next week.   .  DISCHARGE PLANNING DISCUSSED: Discharge to self and family under MD supervision once all goals have been met or patient has reached maximum potential. Discussed with the pt the POC to continue HHPT with the remaining frequency of 1 more visit this week then 2w1 for the ext. The pt is in agreement to the POC at this time.

## 2024-10-04 ENCOUNTER — HOME CARE VISIT (OUTPATIENT)
Age: 69
End: 2024-10-04
Payer: MEDICARE

## 2024-10-04 VITALS
DIASTOLIC BLOOD PRESSURE: 76 MMHG | SYSTOLIC BLOOD PRESSURE: 120 MMHG | TEMPERATURE: 97.6 F | OXYGEN SATURATION: 99 % | HEART RATE: 99 BPM

## 2024-10-04 VITALS
RESPIRATION RATE: 18 BRPM | HEART RATE: 67 BPM | OXYGEN SATURATION: 98 % | TEMPERATURE: 97 F | DIASTOLIC BLOOD PRESSURE: 70 MMHG | SYSTOLIC BLOOD PRESSURE: 120 MMHG

## 2024-10-04 PROCEDURE — G0300 HHS/HOSPICE OF LPN EA 15 MIN: HCPCS

## 2024-10-04 PROCEDURE — G0157 HHC PT ASSISTANT EA 15: HCPCS

## 2024-10-04 ASSESSMENT — ENCOUNTER SYMPTOMS: PAIN LOCATION - PAIN QUALITY: SORE

## 2024-10-04 NOTE — HOME HEALTH
Skilled Needs: Education and Wound Care   Caregiver involvement: Pt independent with care with the exception of wound treatment due to location   Medications reviewed and all medications are available in the home this visit.    The following education was provided regarding medications:  HERMAN  MD notified of any discrepancies/look a-like medications/medication interactions: HERMAN  Medications are effecrtive at this time.    Home health supplies by type and quantity ordered/delivered this visit include:  Supplies available   Patient education provided this visit:  Reviewed to reported any redness, swelling, warmth, fever, chills, increased heart/respiratory rate, uncontrolled pain/tenderness, drainage:green/yellow/brown, or odor to MD immediately. Wound vac discontinued last week and orders given for new care. wound care completed and wound is almost healed. Son demonstrated the ability to change dressing as ordered. No other concerns at this time. Pt monitors glucose levels 2x a week per MD.   Sharps education provided: Clinician instructed patient/CG on proper disposal of sharps: Containers should be made of hard plastic, be puncture-resistant and leakproof, such as a laundry detergent or bleach bottle.  When the container is ¾ full, it should be sealed with tape and labeled DO NOT RECYCLE prior to discarding in the regular trash.    Patient level of understanding of education provided: Verbalzied all understanding to above education  Skilled Care Performed this visit: Education and Wound Care  Patient response to procedure performed: Minimal pain during dressing change   Agency Progress toward goals: Progressing toward interventions above  Patient's Progress towards personal goals: when patient reaches goals and medication is managed, and disease processes are understood patient agrees and understand that discharge will take place

## 2024-10-04 NOTE — HOME HEALTH
SUBJECTIVE: The pt and his son report use of the BSC (located in the kitchen) over the past two days. The patient states he was able to perform a stand/pivot transfer with the RW without difficulty.   .  PAIN: see pain assessment  .  OBJECTIVE: see interventions  .  NEXT MD APPT: [facility] surgical specialtists 10/7/24  .  CAREGIVER ASSISTANCE NEEDED FOR: The pt lives with his wife and son. His son is his primary CG. He requires assistance for ADLs, transportation, shopping, meals, medication management, safety.  .  ASSESSMENT AND PROGRESS TOWARD GOALS/PATIENT RESPONSE TO TREATMENT: The pt demonstratred a positive result in HHPT on this date as the ability to perform WC<>BSC transfers with the RW utilizing stand/pivot transfers with SBA. He has progressed nicely towards goals and will benefit from a transition to outpatient PT for progressive rehabilitation - outpatient PT order requested during this visit.   .  PATIENT LEVEL OF UNDERSTANDING OF EDUCATION: good, the patient and his son demonstrate good safety awareness with moblity and transfers.   .  PLAN: Plan to review safety and the HEP next visit in anticipation of HHPT DC next week.   .  DISCHARGE PLANNING DISCUSSED: Discharge to self and family under MD supervision once all goals have been met or patient has reached maximum potential. Discussed with the pt the POC to continue HHPT with the remaining frequency of 1 more visit this week then 2w1 for the ext. The pt is in agreement to the POC at this time.

## 2024-10-04 NOTE — HOME HEALTH
Skilled Needs: Education and Wound Care   Caregiver involvement: Pt independent with care with the exception of wound treatment due to location   Medications reviewed and all medications are available in the home this visit.    The following education was provided regarding medications:  HERMAN SIMMONS notified of any discrepancies/look a-like medications/medication interactions: HERMAN  Medications are effecrtive at this time.    Home health supplies by type and quantity ordered/delivered this visit include:  Supplies available   Patient education provided this visit:  Reviewed to reported any redness, swelling, warmth, fever, chills, increased heart/respiratory rate, uncontrolled pain/tenderness, drainage:green/yellow/brown, or odor to MD immediately. Wound healed and pt due to see MD on monday. Son is able to monitor site and can change wound if needed. No change in medication o other needs at this time. COmmunicated with MD and other disciplins to et them know pt was discharged today from nursing   Sharps education provided: HERMAN  Patient level of understanding of education provided: Verbalzied all understanding to above education  Skilled Care Performed this visit: Education and Wound Care  Patient response to procedure performed: Minimal pain during dressing change   Agency Progress toward goals: Progressing toward interventions above  Patient's Progress towards personal goals: when patient reaches goals and medication is managed, and disease processes are understood patient agrees and understand that discharge will take place

## 2024-10-08 ENCOUNTER — HOME CARE VISIT (OUTPATIENT)
Age: 69
End: 2024-10-08
Payer: MEDICARE

## 2024-10-08 PROCEDURE — G0157 HHC PT ASSISTANT EA 15: HCPCS

## 2024-10-09 NOTE — HOME HEALTH
SUBJECTIVE: The pt states he is scheduled to start outpatient PT Wednesday 10/16/24.  .  PAIN: see pain assessment  .  OBJECTIVE: see interventions  .  NEXT MD APPT: [facility]  .  CAREGIVER ASSISTANCE NEEDED FOR: The pt lives with his wife and son. His son is his primary CG. He requires assistance for ADLs, transportation, shopping, meals, medication management, safety.  .  ASSESSMENT AND PROGRESS TOWARD GOALS/PATIENT RESPONSE TO TREATMENT: The pt has progressed nicely towards HHPT goals and is ready to transition to outpatient PT for progressive rehabilitaiton. He will continue to require CG assistance for safety and mobility. He is not safe to be performing the stairs and his son will need to assist with the ramp for exiting and entering the home. He is mod(I) with bed mobility, mod(I) for bed<>WC transfers, SBA for sit to stand to the RW transfers, and SBA to CGA for short distance ambulation. He continues to lack tanya knee ext R>L, creating difficulty with ambulation. He is also continuing to experience R knee pain, but is managed with rest and a brace. He will benefit from the transition to outpatient PT for progressive PT to progress towards PLF which includes ambulation and (I) ADLs.   .  PATIENT LEVEL OF UNDERSTANDING OF EDUCATION: good, the patient and his son demonstrate good safety awareness with moblity and transfers.   .  PLAN: Reassessment with anticipated DC next visit.   .  DISCHARGE PLANNING DISCUSSED: Discharge to self and family under MD supervision once all goals have been met or patient has reached maximum potential. Discussed with the pt the POC to continue HHPT with the remaining frequency of 1 more visit this week with planned DC at that time. The pt is in agreement to the POC at this time.

## 2024-10-10 ENCOUNTER — HOME CARE VISIT (OUTPATIENT)
Age: 69
End: 2024-10-10
Payer: MEDICARE

## 2024-10-10 VITALS
RESPIRATION RATE: 17 BRPM | DIASTOLIC BLOOD PRESSURE: 86 MMHG | TEMPERATURE: 98.5 F | HEART RATE: 96 BPM | OXYGEN SATURATION: 98 % | SYSTOLIC BLOOD PRESSURE: 112 MMHG

## 2024-10-10 PROCEDURE — G0151 HHCP-SERV OF PT,EA 15 MIN: HCPCS

## 2024-10-10 ASSESSMENT — ENCOUNTER SYMPTOMS: DYSPNEA ACTIVITY LEVEL: AFTER AMBULATING MORE THAN 20 FT
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
